# Patient Record
Sex: FEMALE | Race: WHITE
[De-identification: names, ages, dates, MRNs, and addresses within clinical notes are randomized per-mention and may not be internally consistent; named-entity substitution may affect disease eponyms.]

---

## 2017-01-02 ENCOUNTER — HOSPITAL ENCOUNTER (EMERGENCY)
Dept: HOSPITAL 10 - FTE | Age: 42
Discharge: HOME | End: 2017-01-02
Payer: MEDICAID

## 2017-01-02 VITALS
RESPIRATION RATE: 18 BRPM | SYSTOLIC BLOOD PRESSURE: 190 MMHG | DIASTOLIC BLOOD PRESSURE: 89 MMHG | TEMPERATURE: 98.3 F | HEART RATE: 99 BPM

## 2017-01-02 VITALS — BODY MASS INDEX: 57.65 KG/M2 | WEIGHT: 249.12 LBS | HEIGHT: 55 IN

## 2017-01-02 DIAGNOSIS — I10: ICD-10-CM

## 2017-01-02 DIAGNOSIS — J06.9: Primary | ICD-10-CM

## 2017-01-02 DIAGNOSIS — J45.909: ICD-10-CM

## 2017-01-02 PROCEDURE — 71010: CPT

## 2017-01-02 PROCEDURE — 96372 THER/PROPH/DIAG INJ SC/IM: CPT

## 2017-01-02 PROCEDURE — 94664 DEMO&/EVAL PT USE INHALER: CPT

## 2017-01-02 PROCEDURE — 96374 THER/PROPH/DIAG INJ IV PUSH: CPT

## 2017-01-02 NOTE — ERD
ER Documentation


Chief Complaint


Date/Time


DATE: 1/2/17 


TIME: 12:54


Chief Complaint


COUGH X3 WEEKS, SOB





HPI


This 5-year-old female presents with cough for last 3 weeks.  She has 

productive sputum.  She also has possible wheezing.  She denies fevers, chest 

pain, vomiting, abdominal pain.





ROS


All systems reviewed and are negative except as per history of present illness.





Medications


Home Meds


Active Scripts


Azithromycin* (Zithromax*) 250 Mg Tablet, 250 MG PO .ZPACK AS DIRECTED, #6 TAB


   TAKE 500 MG (2 TABS) THE FIRST DAY THEN 250 MG (1 TAB) DAYS 2-5


   Prov:LANDON MCKINNEY MD         1/2/17


Diphenhydramine Hcl* (Benadryl*) 50 Mg Cap, 50 MG PO Q6 Y for rash / itch, #30 

CAP


   Prov:LANDON MCKINNEY MD         1/2/17


Albuterol Sulfate* (Ventolin HFA*) 18 Gm Hfa.aer.ad, 2 PUFF INHALATION Q4H, #1 

INHALER


   Prov:LANDON MCKINNEY MD         1/2/17


Prednisone* (Prednisone*) 20 Mg Tab, 60 MG PO DAILY for 4 Days, TAB


   Prov:LANDON MCKINNEY MD         1/2/17


Pseudoephedrine Hcl (Sudafed 12 Hour) 120 Mg Tablet.sa, 120 MG PO BID, #20


   Prov:NITISH AREVALO NP         12/26/15


Amoxicillin-Clavulanate K* (Augmentin*) 875 Mg Tab, 875 MG PO BID, #20 TAB


   Prov:NITISH AREVALO NP         12/26/15


Albuterol Sulfate* (Albuterol Sulfate* HFA) 8.5 Gm Hfa.aer.ad, 1-2 PUFF INH Q4 

Y for SHORTNESS OF BREATH, #1 EA


   Prov:BAN AQUINO PA-C         12/18/15


Benzonatate* (Tessalon Perle*) 100 Mg Capsule, 100 MG PO Q8H Y for COUGH, #30 

CAP


   Prov:BAN AQUINO PA-C         12/18/15


Reported Medications


Prenatal Vits W-Ca,Fe,Fa(<1MG) (Prenatal Vitamins) 1 Tab Tablet, 1 TAB PO DAILY


   8/7/15





Allergies


Allergies:  


Coded Allergies:  


     methylprednisolone (Verified  Allergy, Intermediate, Itching, Rash, 1/2/17)





PMhx/Soc


History of Surgery:  No


Anesthesia Reaction:  No


Hx Neurological Disorder:  No


Hx Respiratory Disorders:  No


Hx Cardiac Disorders:  Yes (htn, hyperlipids)


Hx Psychiatric Problems:  No


Hx Miscellaneous Medical Probl:  No


Hx Alcohol Use:  No


Hx Substance Use:  No


Hx Tobacco Use:  No


Smoking Status:  Never smoker





Physical Exam


Vitals





Vital Signs








  Date Time  Temp Pulse Resp B/P Pulse Ox O2 Delivery O2 Flow Rate FiO2


 


1/2/17 11:53  77 22  99   21


 


1/2/17 10:04 97.1 75 18 125/60 98   








Physical Exam


Const:  [] Alert, non-ill-appearing, audible wheezing.


Head:   Atraumatic 


Eyes:    Normal Conjunctiva


ENT:    Normal External Ears, Nose and Mouth.


Neck:               Full range of motion..~ No meningismus.


Resp:    Clear to auscultation bilaterally.  Diffuse wheezing without rales or 

retractions appreciated.


Cardio:    Regular rate and rhythm, no murmurs


Abd:    Soft, non tender, non distended. Normal bowel sounds


Skin:    No petechiae or rashes


Back:    No midline or flank tenderness


Ext:    No cyanosis, or edema


Neur:    Awake and alert


Psych:    Normal Mood and Affect


Results 24 hrs





 Current Medications








 Medications


  (Trade)  Dose


 Ordered  Sig/Shanae


 Route


 PRN Reason  Start Time


 Stop Time Status Last Admin


Dose Admin


 


 Methylprednisolone


 Sodium Succinate


  (Solu-Medrol)  125 mg  ONCE  ONCE


 IM


   1/2/17 11:30


 1/2/17 11:31 DC 1/2/17 11:35


 


 


 Albuterol


  (Proventil 0.5%


  (Neb))  5 mg  ONCE  STAT


 HHN


   1/2/17 11:21


 1/2/17 11:23 DC 1/2/17 11:53


 


 


 Diphenhydramine


 HCl


  (Benadryl)  25 mg  ONCE  ONCE


 PO


   1/2/17 12:00


 1/2/17 12:01 DC 1/2/17 12:00


 


 


 Diphenhydramine


 HCl


  (Benadryl)  25 mg  ONCE  ONCE


 IV


   1/2/17 12:30


 1/2/17 12:31 DC 1/2/17 12:33


 











Procedures/MDM


Chest X-ray 1V Interpreted by me:


Soft Tissue:                                               No acute 

abnormalities


Bones:                                                    No acute abnormalities


Mediastinum/Cardiac Silhouette/Lungs:     [No acute abnormalities].  Impression-

normal 1 view chest x-ray


Patient was given albuterol 5 mg hand-held nebulizer Solu-Medrol 125 mg IM.  

Patient complained of itchy rash.  Observation and treatment.  Patient was 

given initially Benadryl 25 mg of mouth and then Benadryl 25 mg IV for 

persistent itchy rash.  Patient had improvement in wheezing with treatment 

however.  Patient has no complaints of chest pain, vomiting, abdominal pain, 

there is no evidence of hypoxemia or tachycardia.  Patient likely has URI with 

wheezing and a rash of uncertain etiology, possibly allergy although allergy to 

Solu-Medrol not likely.  Patient shows no evidence of anaphylaxis respiratory 

distress.  She will be treated course of Benadryl at home a short course of 

prednisone and Ventolin and Zithromax at home and instructed to call the 

primary doctor this week.  She should otherwise return to the ER for new or 

worsening symptoms.  Signs and symptoms are not consistent with acute coronary 

syndrome, pulmonary embolism, pneumonia, additional causes of wheezing.





Departure


Diagnosis:  


 Primary Impression:  


 URI, acute


 Additional Impression:  


 Reactive airway disease


 Asthma severity:  unspecified severity  Asthma complication type:  

uncomplicated  Qualified Code:  J45.909 - Reactive airway disease, unspecified 

asthma severity, uncomplicated


Condition:  Stable


Patient Instructions:  Bronchitis With Wheezing (Adult)





Additional Instructions:  


Cheque otro vez con serrano doctor primario en el proximo huber or regresa para mas o 

nueva simptomas.











LANDON MCKINNEY MD Jan 2, 2017 12:56

## 2017-01-02 NOTE — RADRPT
PROCEDURE:   XR Chest. 

 

CLINICAL INDICATION:   Cough/shortness of breath

  

 

TECHNIQUE:   Chest AP portable. 

 

COMPARISON:   12/18/2015 

 

FINDINGS:

 

The mediastinal structures are unremarkable.  The heart is normal in size and configuration.  The pu
lmonary vascularity is normal.  The lung fields are unremarkable.  No consolidation is identified.  
The pleural spaces are unremarkable.  The axial skeleton is unremarkable.  

 

IMPRESSION:

 

No active intrathoracic disease.   

 

 

RPTAT: [facility]

_____________________________________________ 

.Joe Villarreal MD, MD           Date    Time 

Electronically viewed and signed by .Joe Villarreal MD, MD on 01/02/2017 11:37 

 

D:  01/02/2017 11:37  T:  01/02/2017 11:37

.B/

## 2017-01-14 ENCOUNTER — HOSPITAL ENCOUNTER (EMERGENCY)
Dept: HOSPITAL 10 - FTE | Age: 42
Discharge: HOME | End: 2017-01-14
Payer: MEDICAID

## 2017-01-14 VITALS
HEIGHT: 63 IN | WEIGHT: 246.92 LBS | WEIGHT: 246.92 LBS | BODY MASS INDEX: 43.75 KG/M2 | HEIGHT: 63 IN | BODY MASS INDEX: 43.75 KG/M2

## 2017-01-14 VITALS
DIASTOLIC BLOOD PRESSURE: 74 MMHG | SYSTOLIC BLOOD PRESSURE: 140 MMHG | HEART RATE: 89 BPM | TEMPERATURE: 100.1 F | RESPIRATION RATE: 18 BRPM

## 2017-01-14 DIAGNOSIS — R50.9: Primary | ICD-10-CM

## 2017-01-14 DIAGNOSIS — I10: ICD-10-CM

## 2017-01-14 DIAGNOSIS — R51: ICD-10-CM

## 2017-01-14 DIAGNOSIS — R11.0: ICD-10-CM

## 2017-01-14 DIAGNOSIS — R05: ICD-10-CM

## 2017-01-14 PROCEDURE — 99284 EMERGENCY DEPT VISIT MOD MDM: CPT

## 2017-01-14 NOTE — ERD
ER Documentation


Chief Complaint


Date/Time


DATE: 1/14/17 


TIME: 12:38


Chief Complaint


fever,cough, body aches since last night





HPI


Patient is a 41-year-old female who presents with fever, cough, headache, body 

aches, chills, and nausea that began yesterday. Pain is 8 out of 10. She took 

Tylenol about 11 AM this morning. She feels nauseous but no vomiting. Denies 

urinary symptoms.





ROS


All systems reviewed and are negative except as per history of present illness.





Medications


Home Meds


Active Scripts


Guaifenesin/Codeine Phosphate (GUAIFENESIN-CODEINE SYRUP) 10 Ml Liquid, 10 ML 

PO QHS, #4 OZ


   Prov:DEVON PEREZ PA-C         1/14/17


Ondansetron (Ondansetron Odt) 4 Mg Tab.rapdis, 4 MG PO Q6H Y for NAUSEA AND/OR 

VOMITING, #20 TAB


   Prov:DEVON PEREZ PA-C         1/14/17


Oseltamivir Phosphate* (Tamiflu*) 75 Mg Capsule, 75 MG PO BID for 5 Days, CAP


   Prov:DEVON PEREZ PA-C         1/14/17


Azithromycin* (Zithromax*) 250 Mg Tablet, 250 MG PO .ZPACK AS DIRECTED, #6 TAB


   TAKE 500 MG (2 TABS) THE FIRST DAY THEN 250 MG (1 TAB) DAYS 2-5


   Prov:LANDON MCKINNEY MD         1/2/17


Diphenhydramine Hcl* (Benadryl*) 50 Mg Cap, 50 MG PO Q6 Y for rash / itch, #30 

CAP


   Prov:LANDON MCKINNEY MD         1/2/17


Albuterol Sulfate* (Ventolin HFA*) 18 Gm Hfa.aer.ad, 2 PUFF INHALATION Q4H, #1 

INHALER


   Prov:LANDON MCKINNEY MD         1/2/17


Prednisone* (Prednisone*) 20 Mg Tab, 60 MG PO DAILY for 4 Days, TAB


   Prov:LANDON MCKINNEY MD         1/2/17


Pseudoephedrine Hcl (Sudafed 12 Hour) 120 Mg Tablet.sa, 120 MG PO BID, #20


   Prov:NITISH AREVALO NP         12/26/15


Amoxicillin-Clavulanate K* (Augmentin*) 875 Mg Tab, 875 MG PO BID, #20 TAB


   Prov:NITISH AREVALO NP         12/26/15


Albuterol Sulfate* (Albuterol Sulfate* HFA) 8.5 Gm Hfa.aer.ad, 1-2 PUFF INH Q4 

Y for SHORTNESS OF BREATH, #1 EA


   Prov:BAN AQUINO PA-C         12/18/15


Benzonatate* (Tessalon Perle*) 100 Mg Capsule, 100 MG PO Q8H Y for COUGH, #30 

CAP


   Prov:BAN AQUINO PA-C         12/18/15


Reported Medications


Prenatal Vits W-Ca,Fe,Fa(<1MG) (Prenatal Vitamins) 1 Tab Tablet, 1 TAB PO DAILY


   8/7/15





Allergies


Allergies:  


Coded Allergies:  


     methylprednisolone (Verified  Allergy, Intermediate, Itching, Rash, 1/2/17)





PMhx/Soc


History of Surgery:  No


Anesthesia Reaction:  No


Hx Neurological Disorder:  No


Hx Respiratory Disorders:  No


Hx Cardiac Disorders:  Yes (htn, hyperlipids)


Hx Psychiatric Problems:  No


Hx Miscellaneous Medical Probl:  No


Hx Alcohol Use:  No


Hx Substance Use:  No


Hx Tobacco Use:  No





FmHx


Family History:  No diabetes





Physical Exam


Vitals





Vital Signs








  Date Time  Temp Pulse Resp B/P Pulse Ox O2 Delivery O2 Flow Rate FiO2


 


1/14/17 11:45 102.3 109 20 142/83 99   








Physical Exam


General: well developed, well nourished, alert, nontoxic, no distress





Head: normocephalic, atraumatic


 


Neck: Supple, nontender, no lymphadenopathy, no midline tenderness





Ears: no tenderness over mastoids bilaterally, TMs nonerythematous, no exudates 

in canal





Oropharynx: no tonsilar erythema or edema, uvula midline, no exudates, no 

kissing tonsils, no drooling





Respiratory: Clear to auscaultation bilaterally, speaks in full sentences, no 

use of accesory muscles or labored breathing, no rales, ronchi, or wheezing





Cardiovascular: RRR, No murmurs





GI: soft, non tender, non distended, negative murphys sign, negative mcburneys 

point tenderness,  no rebound or guarding


Results 24 hrs





 Current Medications








 Medications


  (Trade)  Dose


 Ordered  Sig/Shanae


 Route


 PRN Reason  Start Time


 Stop Time Status Last Admin


Dose Admin


 


 Ibuprofen


  (Motrin)  800 mg  ONCE  ONCE


 PO


   1/14/17 13:00


 1/14/17 13:01   


 


 


 Ondansetron HCl


  (Zofran Odt)  4 mg  ONCE  STAT


 ODT


   1/14/17 12:31


 1/14/17 12:32 DC  


 











Procedures/MDM


41-year-old female presents with fever 102.3 documented in triage however I 

rechecked the temperature in examination room is 98.6. She took Tylenol before 

arriving to the emergency room. She was given Motrin and Zofran here before 

being discharged. She is flulike symptoms that began yesterday and therefore 

she is a candidate for Tamiflu. She is given a prescription for Tamiflu, Zofran

, and cough syrup.Recommended this patient follow up with her primary care 

doctor within 48 hours or return to the emergency room for any worsening of 

symptoms. However this time I do believe there is suitable for outpatient 

management. I answered all their questions and they agreed with the plan and 

were discharged home.





Departure


Diagnosis:  


 Primary Impression:  


 Influenza-like symptoms


Condition:  Stable


Patient Instructions:  Influenza (Adult)





Additional Instructions:  


Llame al doctor KYLEE y leo suhail MEAGAN PARA DENTRO DE 1-2 WOODWARD.Dgale a la 

secretaria que nosotros le instruimos hacer esta meagan.Avise o llame si serrano 

condicin se empeora antes de la meagan. Regresa aqui si peor o no mejor.











DEVON PEREZ PA-C Jan 14, 2017 12:41

## 2018-06-06 ENCOUNTER — HOSPITAL ENCOUNTER (EMERGENCY)
Age: 43
LOS: 1 days | Discharge: HOME | End: 2018-06-07

## 2018-06-06 ENCOUNTER — HOSPITAL ENCOUNTER (EMERGENCY)
Dept: HOSPITAL 91 - FTE | Age: 43
LOS: 1 days | Discharge: HOME | End: 2018-06-07
Payer: MEDICAID
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
## 2018-06-06 DIAGNOSIS — S40.862A: Primary | ICD-10-CM
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
## 2018-06-06 DIAGNOSIS — I10: ICD-10-CM

## 2018-06-06 PROCEDURE — 99284 EMERGENCY DEPT VISIT MOD MDM: CPT

## 2018-06-06 PROCEDURE — 96372 THER/PROPH/DIAG INJ SC/IM: CPT

## 2018-06-07 RX ADMIN — DIPHENHYDRAMINE HYDROCHLORIDE 1 MG: 50 INJECTION, SOLUTION INTRAMUSCULAR; INTRAVENOUS at 01:25

## 2018-06-07 RX ADMIN — FAMOTIDINE 1 MG: 20 TABLET ORAL at 01:25

## 2018-06-12 ENCOUNTER — HOSPITAL ENCOUNTER (EMERGENCY)
Age: 43
Discharge: HOME | End: 2018-06-12

## 2018-06-12 ENCOUNTER — HOSPITAL ENCOUNTER (EMERGENCY)
Dept: HOSPITAL 91 - E/R | Age: 43
Discharge: HOME | End: 2018-06-12
Payer: MEDICAID
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
## 2018-06-12 DIAGNOSIS — S40.869A: ICD-10-CM
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
## 2018-06-12 DIAGNOSIS — B02.9: Primary | ICD-10-CM

## 2018-06-12 DIAGNOSIS — I10: ICD-10-CM

## 2018-06-12 PROCEDURE — 99283 EMERGENCY DEPT VISIT LOW MDM: CPT

## 2018-11-10 ENCOUNTER — HOSPITAL ENCOUNTER (EMERGENCY)
Age: 43
Discharge: HOME | End: 2018-11-10